# Patient Record
Sex: FEMALE | URBAN - METROPOLITAN AREA
[De-identification: names, ages, dates, MRNs, and addresses within clinical notes are randomized per-mention and may not be internally consistent; named-entity substitution may affect disease eponyms.]

---

## 2019-06-19 ENCOUNTER — APPOINTMENT (RX ONLY)
Dept: URBAN - METROPOLITAN AREA CLINIC 26 | Facility: CLINIC | Age: 74
Setting detail: DERMATOLOGY
End: 2019-06-19

## 2019-06-19 VITALS — SYSTOLIC BLOOD PRESSURE: 135 MMHG | HEART RATE: 87 BPM | DIASTOLIC BLOOD PRESSURE: 81 MMHG

## 2019-06-19 PROBLEM — C44.01 BASAL CELL CARCINOMA OF SKIN OF LIP: Status: ACTIVE | Noted: 2019-06-19

## 2019-06-19 PROCEDURE — 13151 CMPLX RPR E/N/E/L 1.1-2.5 CM: CPT

## 2019-06-19 PROCEDURE — ? MOHS SURGERY

## 2019-06-19 PROCEDURE — 17311 MOHS 1 STAGE H/N/HF/G: CPT

## 2019-06-19 NOTE — PROCEDURE: MIPS QUALITY
Quality 110: Preventive Care And Screening: Influenza Immunization: Influenza Immunization Administered during Influenza season
Quality 111:Pneumonia Vaccination Status For Older Adults: Pneumococcal Vaccination not Administered or Previously Received, Reason not Otherwise Specified
Quality 226: Preventive Care And Screening: Tobacco Use: Screening And Cessation Intervention: Patient screened for tobacco use and is an ex/non-smoker
Detail Level: Detailed
Quality 130: Documentation Of Current Medications In The Medical Record: Current Medications Documented

## 2025-07-24 ENCOUNTER — OFFICE VISIT (OUTPATIENT)
Age: 80
End: 2025-07-24
Payer: MEDICARE

## 2025-07-24 DIAGNOSIS — H81.11 BENIGN PAROXYSMAL VERTIGO OF RIGHT EAR: ICD-10-CM

## 2025-07-24 DIAGNOSIS — R42 DIZZINESS AND GIDDINESS: Primary | ICD-10-CM

## 2025-07-24 PROCEDURE — 97112 NEUROMUSCULAR REEDUCATION: CPT

## 2025-07-24 NOTE — PROGRESS NOTES
"Daily Note     Today's date: 2025  Patient name: Joanne Deluca  : 1945  MRN: [id number]  Referring provider: Naveed Garcia, DO  Encounter Diagnoses   Name Primary?    Dizziness and giddiness Yes    Benign paroxysmal vertigo of right ear                   Subjective: \"I feel Ok today, but not great. I think I might have slept too long.\"   Current: slight fuzziness.     Objective: See treatment diary below  BP: 112/59     Assessment: Tolerated treatment well. Patient would benefit from continued PT  Progressed POC to include Blaze Pods with feet, and included dual task with dynamic exercise. Patient benefited from dual task exercises will continue to progress as tolerates.     Plan: Continue per plan of care.         Precautions: CVA, fall risk, orthostatic hypotension      Treatment Diary 25            Neuro Re-Ed                          Blaze Pods             Wide MIRIAM - Firm - either foot  Hit: 21, Strikes: 0 Misses 2  Veloc: 1057            Wide MIRIAM - Firm- right foot  Hit: 21, Strikes: 0 Misses 2  Veloc: 1035            Wide MIRIAM - Airex - bilateral feet  Hit: 23 Strikes: 0 Misses 0  Veloc: 1112                         Dynamic Balance              Marching with dual task naming animals in ABC order  50 ft x 2            Walking walk with head turn with dual task (17 H words)  50ft x 2            Walking walk with head turn with dual task (15 B words)  50 ft x2                         Standing narrow MIRIAM VOR   Head turns/Nod 30 sec each                          Static balance: wide MIRIAM -EC x30 sec             Static balance: narrow MIRIAM -EC x30 sec                                                                                                                                                       "

## 2025-07-29 ENCOUNTER — APPOINTMENT (OUTPATIENT)
Age: 80
End: 2025-07-29
Payer: MEDICARE

## 2025-07-31 ENCOUNTER — OFFICE VISIT (OUTPATIENT)
Age: 80
End: 2025-07-31
Payer: MEDICARE

## 2025-07-31 DIAGNOSIS — H81.11 BENIGN PAROXYSMAL VERTIGO OF RIGHT EAR: ICD-10-CM

## 2025-07-31 DIAGNOSIS — R42 DIZZINESS AND GIDDINESS: Primary | ICD-10-CM

## 2025-07-31 PROCEDURE — 97112 NEUROMUSCULAR REEDUCATION: CPT

## 2025-08-07 ENCOUNTER — OFFICE VISIT (OUTPATIENT)
Age: 80
End: 2025-08-07
Payer: MEDICARE

## 2025-08-07 DIAGNOSIS — R42 DIZZINESS AND GIDDINESS: Primary | ICD-10-CM

## 2025-08-07 DIAGNOSIS — H81.11 BENIGN PAROXYSMAL VERTIGO OF RIGHT EAR: ICD-10-CM

## 2025-08-07 PROCEDURE — 97110 THERAPEUTIC EXERCISES: CPT

## 2025-08-07 PROCEDURE — 97112 NEUROMUSCULAR REEDUCATION: CPT

## 2025-08-11 ENCOUNTER — OFFICE VISIT (OUTPATIENT)
Age: 80
End: 2025-08-11
Payer: MEDICARE

## 2025-08-14 ENCOUNTER — OFFICE VISIT (OUTPATIENT)
Age: 80
End: 2025-08-14
Payer: MEDICARE

## 2025-08-20 PROBLEM — R73.9 HYPERGLYCEMIA: Status: ACTIVE | Noted: 2025-08-20

## 2025-08-20 PROBLEM — E66.3 OVERWEIGHT: Status: ACTIVE | Noted: 2025-08-20

## 2025-08-20 PROBLEM — I63.9 CEREBROVASCULAR ACCIDENT (CVA) (HCC): Status: ACTIVE | Noted: 2025-08-20

## 2025-08-20 PROBLEM — I47.10 SUPRAVENTRICULAR TACHYCARDIA (HCC): Status: ACTIVE | Noted: 2025-08-20

## 2025-08-20 PROBLEM — I71.20 THORACIC AORTIC ANEURYSM WITHOUT RUPTURE (HCC): Status: ACTIVE | Noted: 2025-08-20

## 2025-08-20 PROBLEM — R06.83 SNORING: Status: ACTIVE | Noted: 2025-08-20

## 2025-08-20 PROBLEM — D69.3 IMMUNE THROMBOCYTOPENIC PURPURA (HCC): Status: ACTIVE | Noted: 2025-08-20

## 2025-08-20 PROBLEM — I35.0 AORTIC VALVE STENOSIS: Status: ACTIVE | Noted: 2025-08-20

## 2025-08-20 PROBLEM — R53.83 FATIGUE: Status: ACTIVE | Noted: 2025-08-20

## 2025-08-20 PROBLEM — D69.6 THROMBOCYTOPENIA, UNSPECIFIED (HCC): Status: ACTIVE | Noted: 2025-08-20

## 2025-08-20 PROBLEM — N18.31 CHRONIC KIDNEY DISEASE, STAGE 3A (HCC): Status: ACTIVE | Noted: 2025-08-20

## 2025-08-20 PROBLEM — I95.1 ORTHOSTATIC HYPOTENSION: Status: ACTIVE | Noted: 2025-08-20

## 2025-08-20 PROBLEM — H53.2 DIPLOPIA: Status: ACTIVE | Noted: 2025-08-20

## 2025-08-20 PROBLEM — D64.9 ANEMIA: Status: ACTIVE | Noted: 2025-08-20

## 2025-08-20 PROBLEM — I10 ESSENTIAL (PRIMARY) HYPERTENSION: Status: ACTIVE | Noted: 2025-08-20

## 2025-08-20 PROBLEM — Z95.2 PRESENCE OF PROSTHETIC HEART VALVE: Status: ACTIVE | Noted: 2025-08-20

## 2025-08-20 PROBLEM — E78.00 PURE HYPERCHOLESTEROLEMIA: Status: ACTIVE | Noted: 2025-08-20

## 2025-08-20 PROBLEM — Z98.890 HISTORY OF CARDIAC CATHETERIZATION: Status: ACTIVE | Noted: 2025-08-20

## 2025-08-20 PROBLEM — M17.11 UNILATERAL PRIMARY OSTEOARTHRITIS, RIGHT KNEE: Status: ACTIVE | Noted: 2025-08-20

## 2025-08-20 PROBLEM — R26.89 BALANCE PROBLEMS: Status: ACTIVE | Noted: 2025-08-20

## 2025-08-22 ENCOUNTER — OFFICE VISIT (OUTPATIENT)
Age: 80
End: 2025-08-22
Payer: MEDICARE

## 2025-08-22 DIAGNOSIS — H81.11 BENIGN PAROXYSMAL VERTIGO OF RIGHT EAR: ICD-10-CM

## 2025-08-22 DIAGNOSIS — R42 DIZZINESS AND GIDDINESS: Primary | ICD-10-CM

## 2025-08-22 PROCEDURE — 97110 THERAPEUTIC EXERCISES: CPT
